# Patient Record
Sex: FEMALE
[De-identification: names, ages, dates, MRNs, and addresses within clinical notes are randomized per-mention and may not be internally consistent; named-entity substitution may affect disease eponyms.]

---

## 2020-04-06 NOTE — PCM.LDHP
L&D History of Present Illness





- General


Date of Service: 20


Admit Problem/Dx: 


 Patient Status Order with Admit Dx/Problem





20 12:51


Patient Status [ADT] Routine 








 Admission Diagnosis/Problem











Admission Diagnosis/Problem    Pregnant - planned














20 18:43


33yo  EDC 2020 32 6/7wks, come today due to abd pain and cramping. O+, 

RI, GBS unkwn.


Source of Information: Patient


History Limitations: Reports: No Limitations





- History of Present Illness


Timing/Duration: Reports: minutes:


Location, Pregnancy: Reports: Abdomen


Quality: Reports: Ache, Burning, Throbbing


Severity: Moderate


Pain Score: 8


Improves with: Reports: None


Worsens with: Reports: None


Associated Symptoms: Denies: vaginal bleeding, vaginal clots, vaginal tissue, 

vaginal discharge, vaginal fluid





- Related Data


Allergies/Adverse Reactions: 


 Allergies











Allergy/AdvReac Type Severity Reaction Status Date / Time


 


aspirin Allergy  Tachycardia Verified 20 12:49











Home Medications: 


 Home Meds





. [No Known Home Meds]  17 [History]











Past Medical History





- Past Health History


Medical/Surgical History: Denies Medical/Surgical History


HEENT History: Reports: None


Cardiovascular History: Reports: None


Respiratory History: Reports: None


Gastrointestinal History: Reports: Chronic Diarrhea, GERD


Genitourinary History: Reports: None


OB/GYN History: Reports: None


Musculoskeletal History: Reports: None


Neurological History: Reports: None


Psychiatric History: Reports: None


Endocrine/Metabolic History: Reports: Obesity/BMI 30+


Hematologic History: Reports: None


Immunologic History: Reports: None


Oncologic (Cancer) History: Reports: None


Dermatologic History: Reports: None





- Infectious Disease History


Infectious Disease History: Reports: Chicken Pox, Hepatitis C, Measles





- Past Surgical History


Head Surgeries/Procedures: Reports: None


GI Surgical History: Reports: Cholecystectomy





Social & Family History





- Family History


Family Medical History: Noncontributory





- Caffeine Use


Caffeine Use: Reports: None





H&P Review of Systems





- Review of Systems:


Review Of Systems: See Below


General: Reports: No Symptoms


HEENT: Reports: No Symptoms


Pulmonary: Reports: No Symptoms


Cardiovascular: Reports: No Symptoms


Gastrointestinal: Reports: No Symptoms


Genitourinary: Reports: No Symptoms


Musculoskeletal: Reports: No Symptoms


Skin: Reports: No Symptoms


Psychiatric: Reports: No Symptoms


Neurological: Reports: No Symptoms


Hematologic/Lymphatic: Reports: No Symptoms


Immunologic: Reports: No Symptoms





L&D Exam





- Exam


Exam: See Below





- Vital Signs


Weight: 207 kg





- OB Specific


Contraction Intensity: Moderate


Fetal Movement: Active


Fetal Heart Tones: Present


Fetal Heart Tones per Min: 140


Fetal Heart Rate (FHR) Variability: Moderate (6-25 bmp)


Birth Presentation: Vertex





- Tatum Score


Tatum Score Cervix Position: Midposition


Tatum Score Consistency: Soft


Tatum Score Effacement: 51-70%


Tatum Score Dilation: 1-2 cm


Tatum Score Infant's Station: -2


Tatum Score Total: 7





- Exam


General: Alert, Oriented, Cooperative, Mild Distress


HEENT: Hearing Intact


Lungs: Clear to Auscultation, Normal Respiratory Effort.  No: Decreased Breath 

Sounds


Cardiovascular: Regular Rate, Regular Rhythm, Normal S1, Normal S2


GI/Abdominal Exam: Soft, Non-Tender, Pelvis Stable


Rectal Exam: Deferred


Genitourinary: Normal external exam, Normal bimanual exam, Cervical dilitation.

  No: Cervical fluid, Vaginal bleeding


Back Exam: Normal Inspection, Full Range of Motion


Extremities: Normal Inspection, Normal Range of Motion, Non-Tender, No Pedal 

Edema


Skin: Warm, Dry, Intact


Neurological: Cranial Nerves Intact, Strength Equal Bilateral, Normal Gait, 

Normal Speech, Normal Tone, Sensation Intact


Psychiatric: Alert, Normal Affect, Normal Mood





- Patient Data


Lab Results Last 24 hrs: 


 Laboratory Results - last 24 hr











  20 Range/Units





  12:40 17:29 


 


WBC   10.71  (4.0-11.0)  K/uL


 


RBC   4.30  (4.30-5.90)  M/uL


 


Hgb   12.1  (12.0-16.0)  g/dL


 


Hct   37.4  (36.0-46.0)  %


 


MCV   87.0  (80.0-98.0)  fL


 


MCH   28.1  (27.0-32.0)  pg


 


MCHC   32.4  (31.0-37.0)  g/dL


 


RDW Std Deviation   43.0  (28.0-62.0)  fl


 


RDW Coeff of Thompson   14  (11.0-15.0)  %


 


Plt Count   152  (150-400)  K/uL


 


MPV   12.10 H  (7.40-12.00)  fL


 


Nucleated RBC %   0.0  /100WBC


 


Nucleated RBCs #   0  K/uL


 


Urine Color  YELLOW   


 


Urine Appearance  HAZY   


 


Urine pH  6.5   (5.0-8.0)  


 


Ur Specific Gravity  1.010   (1.001-1.035)  


 


Urine Protein  NEGATIVE   (NEGATIVE)  mg/dL


 


Urine Glucose (UA)  NEGATIVE   (NEGATIVE)  mg/dL


 


Urine Ketones  40 H   (NEGATIVE)  mg/dL


 


Urine Occult Blood  TRACE-INTACT H   (NEGATIVE)  


 


Urine Nitrite  NEGATIVE   (NEGATIVE)  


 


Urine Bilirubin  NEGATIVE   (NEGATIVE)  


 


Urine Urobilinogen  0.2   (<2.0)  EU/dL


 


Ur Leukocyte Esterase  NEGATIVE   (NEGATIVE)  











Result Diagrams: 


 20 17:29








- Problem List


(1) Supervision of normal IUP (intrauterine pregnancy) in primigravida


SNOMED Code(s): 79549635, 556595607, 927171807, 208344258


   ICD Code: Z34.00 - ENCNTR FOR SUPRVSN OF NORMAL FIRST PREGNANCY, UNSP 

TRIMESTER   Status: Acute   Priority: High   Current Visit: Yes   


Qualifiers: 


   Trimester: third trimester   Qualified Code(s): Z34.03 - Encounter for 

supervision of normal first pregnancy, third trimester   





(2)  contractions


SNOMED Code(s): 818192992


   ICD Code: O47.9 - FALSE LABOR, UNSPECIFIED   Status: Acute   Priority: High 

  Current Visit: Yes   


Problem List Initiated/Reviewed/Updated: Yes


Orders Last 24hrs: 


 Active Orders 24 hr











 Category Date Time Status


 


 Patient Status [ADT] Routine ADT  20 12:51 Active


 


 Communication Order [RC] Per Unit Routine Care  20 16:57 Active


 


 Communication Order [RC] Per Unit Routine Care  20 16:57 Active


 


 Fetal Heart Tones [RC] ASDIRECTED Care  20 16:57 Active


 


 Fetal Non Stress Test [RC] PER UNIT ROUTINE Care  20 12:51 Active


 


 Height and Weight [RC] DAILY Care  20 16:57 Active


 


 Intake and Output [RC] QSHIFT Care  20 16:57 Active


 


 Notify Provider [RC] PRN Care  20 16:57 Active


 


 Notify Provider [RC] PRN Care  20 16:57 Active


 


 Up ad Christel [RC] ASDIRECTED Care  20 12:51 Active


 


 Vaginal Exam [RC] Click to Edit Care  20 12:51 Active


 


 Vital Signs [RC] PER UNIT ROUTINE Care  20 12:51 Active


 


 Vital Signs [RC] PER UNIT ROUTINE Care  20 16:57 Active


 


 Betamet Acet/Betamet Na Phos [Celestone Soluspan 6 MG/ Med  20 17:00 

Active





 ML]   





 12 mg IM Q24H   


 


 Calcium Gluconate Med  20 16:57 Active





 1 gm IVPUSH ASDIRECTED PRN   


 


 Lactated Ringers [Ringers, Lactated] 1,000 ml Med  20 13:45 Active





 IV ASDIRECTED   


 


 Magnesium Sulfate/Water [Magnesium Sulfate in Water Med  20 17:00 Active





 Premix]   





 20 gm in 500 ml IV ASDIRECTED   


 


 Sodium Chloride 0.9% [Normal Saline] Med  20 13:45 Active





 10 ml IV ASDIRECTED PRN   


 


 Sodium Chloride 0.9% [Saline Flush] Med  20 13:45 Active





 10 ml FLUSH ASDIRECTED PRN   


 


 Sodium Chloride 0.9% [Saline Flush] Med  20 13:45 Active





 2.5 ml FLUSH ASDIRECTED PRN   


 


 Auscultate Breath Sounds [OM.PC] Per Unit Routine Oth  20 16:57 Ordered


 


 Deep Tendon Reflexes [WOMSER] [address]  20 17:00 Ordered


 


 Deep Tendon Reflexes [WOMSER] [address]  20 18:00 Ordered


 


 Deep Tendon Reflexes [WOMSER] [address]  20 19:00 Ordered


 


 Deep Tendon Reflexes [WOMSER] [address]  20 20:00 Ordered


 


 Deep Tendon Reflexes [WOMSER] [address]  20 21:00 Ordered


 


 Deep Tendon Reflexes [WOMSER] [address]  20 22:00 Ordered


 


 Deep Tendon Reflexes [WOMSER] [address]  20 23:00 Ordered


 


 Deep Tendon Reflexes [WOMSER] [address]  20 00:00 Ordered


 


 Deep Tendon Reflexes [WOMSER] [address]  20 01:00 Ordered


 


 Deep Tendon Reflexes [WOMSER] [address]  20 02:00 Ordered


 


 Deep Tendon Reflexes [WOMSER] [address]  20 03:00 Ordered


 


 Deep Tendon Reflexes [WOMSER] Q1H Oth  20 04:00 Ordered


 


 Deep Tendon Reflexes [WOMSER] Q1H Ot  20 05:00 Ordered


 


 Deep Tendon Reflexes [WOMSER] Q1 Ot  20 06:00 Ordered


 


 Deep Tendon Reflexes [WOMSER] Q1 Ot  20 07:00 Ordered


 


 Deep Tendon Reflexes [WOMSER] Q1 Ot  20 08:00 Ordered


 


 Deep Tendon Reflexes [WOMSER] Q1 Ot  20 09:00 Ordered


 


 Deep Tendon Reflexes [WOMSER] Q1 Ot  20 10:00 Ordered


 


 Deep Tendon Reflexes [WOMSER] Q1 Ot  20 11:00 Ordered


 


 Deep Tendon Reflexes [WOMSER] Q1 Ot  20 12:00 Ordered


 


 Deep Tendon Reflexes [WOMSER] Q1 Ot  20 13:00 Ordered


 


 Deep Tendon Reflexes [WOMSER] [facility]  20 14:00 Ordered


 


 Deep Tendon Reflexes [WOMSER] [facility]  20 15:00 Ordered


 


 Deep Tendon Reflexes [WOMSER] Q1 Ot  20 16:00 Ordered


 


 Deep Tendon Reflexes [WOMSER] Q1 Ot  20 17:00 Ordered


 


 Electronic Fetal Heart Tones Ext w TOCO [WOMSER] Per Oth  20 16:57 

Ordered





 Unit Routine   


 


 Peripheral IV Insertion Adult [OM.PC] Routine Oth  20 13:45 Ordered


 


 Resuscitation Status Routine Resus Stat  20 12:51 Ordered








 Medication Orders





Betamethasone Acet/Betameth SodPhos (Celestone Soluspan 6 Mg/Ml)  12 mg IM Q24H 

MIRELLA


   Stop: 20 17:01


   Last Admin: 20 17:36  Dose: 12 mg


Calcium Gluconate (Calcium Gluconate)  1 gm IVPUSH ASDIRECTED PRN


   PRN Reason: respiratory distress


Lactated Ringer's (Ringers, Lactated)  1,000 mls @ 999 mls/hr IV ASDIRECTED MIRELLA


   Last Admin: 20 14:10  Dose: 999 mls/hr


Magnesium Sulfate (Magnesium Sulfate In Water Premix)  20 gm in 500 mls @ 50 mls

/hr IV ASDIRECTED MIRELLA


   Last Admin: 20 17:56  Dose: 2 gm/hr, 50 mls/hr


Sodium Chloride (Saline Flush)  10 ml FLUSH ASDIRECTED PRN


   PRN Reason: Keep Vein Open


Sodium Chloride (Saline Flush)  2.5 ml FLUSH ASDIRECTED PRN


   PRN Reason: Keep Vein Open


Sodium Chloride (Normal Saline)  10 ml IV ASDIRECTED PRN


   PRN Reason: IV Use








Assessment/Plan Comment:: 





 Labor


A: 33yo  EDC 2020 32 6/7wks, come today due to abd pain and cramping. O

+, RI, GBS unkwn. SVE 2/70/-2 soft mid (tatum 7). States pain up to 8 until 

medication then down to 4. 


P: Admit for ops 24h. Gave IV fluid bolus, Terb 0.25sq, and IV Nubain for pain. 

This helped the pain and slowed the contractions some. SVE noted cervical 

change. Disc POC with Dr Fowler and started MgSo4 at 1756, betamethasone 12mg 

given at 1736. Will continue to monitor. If needed will transfer to [place].

## 2020-04-06 NOTE — US
Renal ultrasound: Multiple real-time images of the kidneys were 

obtained.

 

Maternal kidneys show no hydronephrosis or mass.  Right kidney 

measures 13.1 cm and left kidney measures 13.6 cm.  Bilateral ureteral

 jets are seen within the bladder.

 

Impression:

1.  No abnormality is seen on renal ultrasound study.

 

Diagnostic code #1

 

This report was dictated in MDT

## 2020-04-07 NOTE — PCM.PN
- General Info


Date of Service: 20


Admission Dx/Problem (Free Text): 


 Patient Status Order with Admit Dx/Problem





20 12:51


Patient Status [ADT] Routine 








 Admission Diagnosis/Problem











Admission Diagnosis/Problem    Pregnant - planned














20 18:43


33yo  EDC 2020 32 6/7wks, come today due to abd pain and cramping. O+, 

RI, GBS unkwn.


Functional Status: Reports: Pain Controlled





- Review of Systems


General: Reports: No Symptoms


HEENT: Reports: No Symptoms


Pulmonary: Reports: No Symptoms


Cardiovascular: Reports: No Symptoms


Gastrointestinal: Reports: No Symptoms


Genitourinary: Reports: No Symptoms


Musculoskeletal: Reports: No Symptoms


Skin: Reports: No Symptoms


Neurological: Reports: No Symptoms


Psychiatric: Reports: No Symptoms





- Patient Data


Weight - Most Recent: 93.894 kg


Lab Results Last 24 Hours: 


 Laboratory Results - last 24 hr











  20 Range/Units





  12:40 17:29 22:19 


 


WBC   10.71   (4.0-11.0)  K/uL


 


RBC   4.30   (4.30-5.90)  M/uL


 


Hgb   12.1   (12.0-16.0)  g/dL


 


Hct   37.4   (36.0-46.0)  %


 


MCV   87.0   (80.0-98.0)  fL


 


MCH   28.1   (27.0-32.0)  pg


 


MCHC   32.4   (31.0-37.0)  g/dL


 


RDW Std Deviation   43.0   (28.0-62.0)  fl


 


RDW Coeff of Thompson   14   (11.0-15.0)  %


 


Plt Count   152   (150-400)  K/uL


 


MPV   12.10 H   (7.40-12.00)  fL


 


Nucleated RBC %   0.0   /100WBC


 


Nucleated RBCs #   0   K/uL


 


Magnesium    5.0 H  (1.8-2.4)  mg/dL


 


Urine Color  YELLOW    


 


Urine Appearance  HAZY    


 


Urine pH  6.5    (5.0-8.0)  


 


Ur Specific Gravity  1.010    (1.001-1.035)  


 


Urine Protein  NEGATIVE    (NEGATIVE)  mg/dL


 


Urine Glucose (UA)  NEGATIVE    (NEGATIVE)  mg/dL


 


Urine Ketones  40 H    (NEGATIVE)  mg/dL


 


Urine Occult Blood  TRACE-INTACT H    (NEGATIVE)  


 


Urine Nitrite  NEGATIVE    (NEGATIVE)  


 


Urine Bilirubin  NEGATIVE    (NEGATIVE)  


 


Urine Urobilinogen  0.2    (<2.0)  EU/dL


 


Ur Leukocyte Esterase  NEGATIVE    (NEGATIVE)  














  20 Range/Units





  03:40 


 


WBC   (4.0-11.0)  K/uL


 


RBC   (4.30-5.90)  M/uL


 


Hgb   (12.0-16.0)  g/dL


 


Hct   (36.0-46.0)  %


 


MCV   (80.0-98.0)  fL


 


MCH   (27.0-32.0)  pg


 


MCHC   (31.0-37.0)  g/dL


 


RDW Std Deviation   (28.0-62.0)  fl


 


RDW Coeff of Thompson   (11.0-15.0)  %


 


Plt Count   (150-400)  K/uL


 


MPV   (7.40-12.00)  fL


 


Nucleated RBC %   /100WBC


 


Nucleated RBCs #   K/uL


 


Magnesium  5.8 H  (1.8-2.4)  mg/dL


 


Urine Color   


 


Urine Appearance   


 


Urine pH   (5.0-8.0)  


 


Ur Specific Gravity   (1.001-1.035)  


 


Urine Protein   (NEGATIVE)  mg/dL


 


Urine Glucose (UA)   (NEGATIVE)  mg/dL


 


Urine Ketones   (NEGATIVE)  mg/dL


 


Urine Occult Blood   (NEGATIVE)  


 


Urine Nitrite   (NEGATIVE)  


 


Urine Bilirubin   (NEGATIVE)  


 


Urine Urobilinogen   (<2.0)  EU/dL


 


Ur Leukocyte Esterase   (NEGATIVE)  











Med Orders - Current: 


 Current Medications





Betamethasone Acet/Betameth SodPhos (Celestone Soluspan 6 Mg/Ml)  12 mg IM Q24H 

[facility]


   Stop: 20 17:01


   Last Admin: 20 17:36 Dose:  12 mg


Calcium Gluconate (Calcium Gluconate)  1 gm IVPUSH ASDIRECTED PRN


   PRN Reason: respiratory distress


Lactated Ringer's (Ringers, Lactated)  1,000 mls @ 999 mls/hr IV ASDIRECTED MIRELLA


   Last Admin: 20 14:10 Dose:  999 mls/hr


Magnesium Sulfate (Magnesium Sulfate In Water Premix)  20 gm in 500 mls @ 50 mls

/hr IV ASDIRECTED MIRELLA


   Last Admin: 20 03:29 Dose:  2 gm/hr, 50 mls/hr


Sodium Chloride (Saline Flush)  10 ml FLUSH ASDIRECTED PRN


   PRN Reason: Keep Vein Open


Sodium Chloride (Saline Flush)  2.5 ml FLUSH ASDIRECTED PRN


   PRN Reason: Keep Vein Open


Sodium Chloride (Normal Saline)  10 ml IV ASDIRECTED PRN


   PRN Reason: IV Use





Discontinued Medications





Butorphanol Tartrate (Stadol)  1 mg IVPUSH ONETIME ONE


   Stop: 20 23:01


   Last Admin: 20 23:52 Dose:  1 mg


Magnesium Sulfate 2 gm/ Premix  50 mls @ 50 mls/hr IV ONETIME ONE


   Stop: 20 18:00


   Last Admin: 20 17:25 Dose:  50 mls/hr


Nalbuphine HCl (Nubain)  10 mg IVPUSH ONETIME ONE


   Stop: 20 13:44


   Last Admin: 20 14:18 Dose:  10 mg


Terbutaline Sulfate (Brethine)  0.25 mg SUBCUT ONETIME ONE


   Stop: 20 15:17


   Last Admin: 20 15:27 Dose:  0.25 mg


Terbutaline Sulfate (Brethine) Confirm Administered Dose 1 mg .ROUTE .STK-MED 

ONE


   Stop: 20 15:20


Terbutaline Sulfate (Brethine)  0.25 mg SUBCUT ONETIME ONE


   Stop: 20 22:55


   Last Admin: 20 23:11 Dose:  0.25 mg











- Exam


General: Alert, Oriented, Cooperative, No Acute Distress


Lungs: Clear to Auscultation, Normal Respiratory Effort.  No: Decreased Breath 

Sounds


Cardiovascular: Regular Rate, Regular Rhythm, No Murmurs


GI/Abdominal Exam: Soft, Non-Tender


 (Female) Exam: Deferred.  No: Cervical Fluid, Vaginal Bleeding


Back Exam: Normal Inspection, Full Range of Motion.  No: CVA Tenderness (L), 

CVA Tenderness (R), Decreased Range of Motion


Extremities: Normal Inspection, Normal Range of Motion, Non-Tender, No Pedal 

Edema, Other (DTR +1).  No: Pedal Edema, Leg Pain


Skin: Warm, Dry, Intact


Neurological: No New Focal Deficit, Normal Speech, Normal Tone, Strength Equal 

Bilateral


Psy/Mental Status: Alert, Normal Affect, Normal Mood





Sepsis Event Note





- Evaluation


Sepsis Screening Result: No Definite Risk





- Problem List & Annotations


(1) Supervision of normal IUP (intrauterine pregnancy) in primigravida


SNOMED Code(s): 54490700, 452424566, 377715561, 171202014


   Code(s): Z34.00 - ENCNTR FOR SUPRVSN OF NORMAL FIRST PREGNANCY, UNSP 

TRIMESTER   Status: Acute   Priority: High   Current Visit: Yes   


Qualifiers: 


   Trimester: third trimester   Qualified Code(s): Z34.03 - Encounter for 

supervision of normal first pregnancy, third trimester   





(2)  contractions


SNOMED Code(s): 412434742


   Code(s): O47.9 - FALSE LABOR, UNSPECIFIED   Status: Acute   Priority: High   

Current Visit: Yes   





- Problem List Review


Problem List Initiated/Reviewed/Updated: Yes





- My Orders


Last 24 Hours: 


My Active Orders





20 16:57


Communication Order [RC] Per Unit Routine 


Communication Order [RC] Per Unit Routine 


Fetal Heart Tones [RC] ASDIRECTED 


Height and Weight [RC] DAILY 


Intake and Output [RC] QSHIFT 


Notify Provider [RC] PRN 


Notify Provider [RC] PRN 


Vital Signs [RC] PER UNIT ROUTINE 


Calcium Gluconate   1 gm IVPUSH ASDIRECTED PRN 


Auscultate Breath Sounds [OM.PC] Per Unit Routine 


Electronic Fetal Heart Tones Ext w TOCO [WOMSER] Per Unit Routine 





20 17:00


Betamet Acet/Betamet Na Phos [Celestone Soluspan 6 MG/ML]   12 mg IM Q24H 


Magnesium Sulfate/Water [Magnesium Sulfate in Water Premix] 20 gm in 500 ml IV 

ASDIRECTED 


Deep Tendon Reflexes [WOMSER] Q1H 





20 18:00


Deep Tendon Reflexes [WOMSER] Q1H 





20 19:00


Deep Tendon Reflexes [WOMSER] Q1H 





20 20:00


Deep Tendon Reflexes [WOMSER] Q1H 





20 20:50


GBS [CULTURE GROUP B STREP] [RM] Routine 





20 21:00


Deep Tendon Reflexes [WOMSER] Q1H 





20 22:00


Deep Tendon Reflexes [WOMSER] Q1H 





20 23:00


Deep Tendon Reflexes [WOMSER] Q1H 





20 00:00


Deep Tendon Reflexes [WOMSER] Q1H 





20 01:00


Deep Tendon Reflexes [WOMSER] [facility] 





20 02:00


Deep Tendon Reflexes [WOMSER] [facility] 





20 03:00


Deep Tendon Reflexes [WOMSER] [facility] 





20 04:00


Deep Tendon Reflexes [WOMSER] [facility] 





20 05:00


Deep Tendon Reflexes [WOMSER] [facility] 





20 06:00


Deep Tendon Reflexes [WOMSER] [facility] 





20 07:00


Deep Tendon Reflexes [WOMSER] [facility] 





20 08:00


Deep Tendon Reflexes [WOMSER] [facility] 





20 09:00


Deep Tendon Reflexes [WOMSER] [facility] 





20 10:00


Deep Tendon Reflexes [WOMSER] [facility] 





20 11:00


Deep Tendon Reflexes [WOMSER] [facility] 





20 12:00


Deep Tendon Reflexes [WOMSER] [facility] 





20 13:00


Deep Tendon Reflexes [WOMSER] [facility] 





20 14:00


Deep Tendon Reflexes [WOMSER] [facility] 





20 15:00


Deep Tendon Reflexes [WOMSER] [facility] 





20 16:00


Deep Tendon Reflexes [WOMSER] [facility] 





20 17:00


Deep Tendon Reflexes [WOMSER] [facility] 





20 Breakfast


Regular Diet [DIET] 














- Plan


Plan:: 





 Labor


A: 33yo  EDC 2020 32 6/7wks, come today due to abd pain and cramping. O

+, RI, GBS unkwn. SVE 2/70/-2 soft mid (tatum 7). States pain up to 8 until 

medication then down to 4. 


P: Admit for ops 24h. Gave IV fluid bolus, Terb 0.25sq, and IV Nubain for pain. 

This helped the pain and slowed the contractions some. SVE noted cervical 

change. Disc POC with Dr Fowler and started MgSo4 at 1756, betamethasone 12mg 

given at 1736. Will continue to monitor. If needed will transfer to [place].

## 2020-04-10 NOTE — PCM.DCSUM1
**Discharge Summary





- Hospital Course


Free Text/Narrative:: 





Late entry discharge. Pt released home with labor precautions.


Diagnosis: Stroke: No


Modified [place] Scale: No Symptoms at All


Modified Sami Scale Score: 0





- Discharge Data


Discharge Date: 20


Discharge Disposition: Home, Self-Care 01


Condition: Stable





- Referral to Home Health


Primary Care Physician: 


Alanna Tejada CNM








- Discharge Diagnosis/Problem(s)


(1) Supervision of normal IUP (intrauterine pregnancy) in primigravida


SNOMED Code(s): 03906088, 570108178, 805183706, 585358699


   ICD Code: Z34.00 - ENCNTR FOR SUPRVSN OF NORMAL FIRST PREGNANCY, UNSP 

TRIMESTER   Status: Acute   Priority: High   


Qualifiers: 


   Trimester: third trimester   Qualified Code(s): Z34.03 - Encounter for 

supervision of normal first pregnancy, third trimester   





(2)  contractions


SNOMED Code(s): 352909911


   ICD Code: O47.9 - FALSE LABOR, UNSPECIFIED   Status: Acute   Priority: High 

  





- Patient Instructions


Diet: Usual Diet as Tolerated


Activity: As Tolerated, Rest and Relax Today


Driving: May Drive Today


Showering/Bathing: May Shower


Notify Provider of: Fever, Increased Pain, Swelling and Redness, Drainage, 

Nausea and/or Vomiting





- Discharge Plan


*PRESCRIPTION DRUG MONITORING PROGRAM REVIEWED*: Not Applicable


*COPY OF PRESCRIPTION DRUG MONITORING REPORT IN PATIENT PAULINA: Not Applicable


Home Medications: 


 Home Meds





NIFEdipine [Nifedical XL] 30 mg PO DAILY #30 tab.er 20 [Rx]


Omeprazole 20 mg PO DAILY 20 [History]


[id number]/Iron Fumarate/FA/DSS [Prenatal 19 Tablet] 1 tab PO DAILY 20 [

History]


Pyridoxine HCl (Vitamin B6) [B-6] 1 tab PO DAILY 20 [History]








Oxygen Therapy Mode: Room Air


Patient Handouts:   Labor and Birth Information, Easy-to-Read


Referrals: 


[facility] [Outside]


Zeina Diego CNM, NP [Mid-Wife] - 04/10/20 11:15 am





- Discharge Summary/Plan Comment


DC Time >30 min.: No





- General Info


Date of Service: 20


Admission Dx/Problem (Free Text: 


 Patient Status Order with Admit Dx/Problem





20 12:51


Patient Status [ADT] Routine 








 Admission Diagnosis/Problem











Admission Diagnosis/Problem    Pregnant - planned














20 18:43


35yo  EDC 2020 32 6/7wks, come today due to abd pain and cramping. O+, 

RI, GBS unkwn.


Functional Status: Reports: Pain Controlled, Tolerating Diet, Ambulating, 

Urinating





- Review of Systems


General: Reports: No Symptoms


HEENT: Reports: No Symptoms


Pulmonary: Reports: No Symptoms


Cardiovascular: Reports: No Symptoms


Gastrointestinal: Reports: No Symptoms


Genitourinary: Reports: No Symptoms


Musculoskeletal: Reports: No Symptoms


Skin: Reports: No Symptoms


Neurological: Reports: No Symptoms


Psychiatric: Reports: No Symptoms





- Patient Data


Weight - Most Recent: 93.894 kg


MARLENA Results - Last 24 hrs: 


 Microbiology











 20 20:50 Group B Streptococcus Culture - Final





 Vaginal/Rectal    NEGATIVE STREP GROUP B











Med Orders - Current: 


 Current Medications








Discontinued Medications





Betamethasone Acet/Betameth SodPhos (Celestone Soluspan 6 Mg/Ml)  12 mg IM Q24H 

[facility]


   Stop: 20 17:01


   Last Admin: 20 17:30 Dose:  12 mg


Butorphanol Tartrate (Stadol)  1 mg IVPUSH ONETIME ONE


   Stop: 20 23:01


   Last Admin: 20 23:52 Dose:  1 mg


Calcium Gluconate (Calcium Gluconate)  1 gm IVPUSH ASDIRECTED PRN


   PRN Reason: respiratory distress


Lactated Ringer's (Ringers, Lactated)  1,000 mls @ 999 mls/hr IV ASDIRECTED [facility]


   Last Admin: 20 14:10 Dose:  999 mls/hr


Magnesium Sulfate (Magnesium Sulfate In Water Premix)  20 gm in 500 mls @ 50 mls

/hr IV ASDIRECTED [facility]


   Last Admin: 20 03:29 Dose:  2 gm/hr, 50 mls/hr


Magnesium Sulfate 2 gm/ Premix  50 mls @ 50 mls/hr IV ONETIME ONE


   Stop: 20 18:00


   Last Admin: 20 17:25 Dose:  50 mls/hr


Nalbuphine HCl (Nubain)  10 mg IVPUSH ONETIME ONE


   Stop: 20 13:44


   Last Admin: 20 14:18 Dose:  10 mg


Sodium Chloride (Saline Flush)  10 ml FLUSH ASDIRECTED PRN


   PRN Reason: Keep Vein Open


Sodium Chloride (Saline Flush)  2.5 ml FLUSH ASDIRECTED PRN


   PRN Reason: Keep Vein Open


Sodium Chloride (Normal Saline)  10 ml IV ASDIRECTED PRN


   PRN Reason: IV Use


Terbutaline Sulfate (Brethine)  0.25 mg SUBCUT ONETIME ONE


   Stop: 20 15:17


   Last Admin: 20 15:27 Dose:  0.25 mg


Terbutaline Sulfate (Brethine) Confirm Administered Dose 1 mg .ROUTE .STK-MED 

ONE


   Stop: 20 15:20


Terbutaline Sulfate (Brethine)  0.25 mg SUBCUT ONETIME ONE


   Stop: 20 22:55


   Last Admin: 20 23:11 Dose:  0.25 mg











- Exam


General: Reports: Alert, Oriented, Cooperative, No Acute Distress


Lungs: Reports: Clear to Auscultation, Normal Respiratory Effort


Cardiovascular: Reports: Regular Rate, Regular Rhythm


GI/Abdominal Exam: Soft, Non-Tender


 (Female) Exam: Normal External Exam, Normal Bimanual Exam, Cervical 

Dilatation (No change in cervical exam)


Rectal (Female) Exam: Deferred


Back Exam: Reports: Normal Inspection, Full Range of Motion


Extremities: Normal Inspection, Normal Range of Motion, Non-Tender, No Pedal 

Edema


Skin: Reports: Warm, Dry, Intact


Neurological: Reports: No New Focal Deficit, Normal Gait, Normal Speech, Normal 

Tone, Strength Equal Bilateral


Psy/Mental Status: Reports: Alert, Normal Affect, Normal Mood

## 2020-05-04 ENCOUNTER — HOSPITAL ENCOUNTER (INPATIENT)
Dept: HOSPITAL 56 - MW.OBCHECK | Age: 35
LOS: 1 days | Discharge: HOME | End: 2020-05-05
Attending: OBSTETRICS & GYNECOLOGY | Admitting: OBSTETRICS & GYNECOLOGY
Payer: SELF-PAY

## 2020-05-04 DIAGNOSIS — E66.9: ICD-10-CM

## 2020-05-04 DIAGNOSIS — Z88.6: ICD-10-CM

## 2020-05-04 DIAGNOSIS — Z3A.36: ICD-10-CM

## 2020-05-04 DIAGNOSIS — Z91.040: ICD-10-CM

## 2020-05-04 PROCEDURE — 3E0R3BZ INTRODUCTION OF ANESTHETIC AGENT INTO SPINAL CANAL, PERCUTANEOUS APPROACH: ICD-10-PCS

## 2020-05-04 PROCEDURE — 00HU33Z INSERTION OF INFUSION DEVICE INTO SPINAL CANAL, PERCUTANEOUS APPROACH: ICD-10-PCS

## 2020-05-04 PROCEDURE — 0KQM0ZZ REPAIR PERINEUM MUSCLE, OPEN APPROACH: ICD-10-PCS | Performed by: OBSTETRICS & GYNECOLOGY

## 2020-05-04 NOTE — PCM.SN.2
- Free Text/Narrative


Note: 





Requested for YISSEL on , active labor, pain 8/10.





Bolus in progress.





Discussed, ? answered, permit signed, wishes to proceed.





Procedure without issues.  Space ID'd via [email or web address] with saline/air on .  

Reconfirmed with saline.





Cath to 8cm without problems.  Occlusive Drsg.  Test NEGATIVE.





Bolus given slowly, significant decrease in discomfort.  VSS.





Infusion started 8ml/hr with 4ml q15 bolus.





Tolerated well  VSS  Pain 1/10 presently.

## 2020-05-04 NOTE — PCM.PRNOTE
- Free Text/Narrative


Note: 





Requested for IV access after previous failed attempts.  Discussed, ? answered.

  #20g L hand on 1st attempt.  Skin localized with 0.6ml 1 Lidocaine.


Tolerated well.

## 2020-05-04 NOTE — PCM.LDHP
L&D History of Present Illness





- General


Date of Service: 20


Admit Problem/Dx: 


 Patient Status Order with Admit Dx/Problem





20 13:06


Patient Status [ADT] Routine 





20 13:39


Patient Status [ADT] Routine 








 Admission Diagnosis/Problem











Admission Diagnosis/Problem    Planned pregnancy














20 14:07


Shira is a 36 yo  at 36.6 weeks gestation (HERIBERTO 2020) that presents to L

&D today from office for C/O strong, painful contractions every 4-5 min.  SVE 

in office 3/90/-1, mid-position.  Amnisure positive today.  O pos, RI, GBS neg (

2020).  Pertient history includes: Obesity (BMI 32.9),  Hx of PTL this 

pregnancy, would to right breast/chest wall drained 1 week ago.  


Source of Information: Patient


History Limitations: Reports: No Limitations





- History of Present Illness


Timing/Duration: Reports: gradual onset


Location, Pregnancy: Reports: Abdomen


Quality: Reports: Sharp


Severity: Severe


Improves with: Reports: Movement


Worsens with: Reports: None





- Related Data


Allergies/Adverse Reactions: 


 Allergies











Allergy/AdvReac Type Severity Reaction Status Date / Time


 


aspirin Allergy  Tachycardia Verified 20 12:49


 


latex Allergy  Rash Verified 20 12:01











Home Medications: 


 Home Meds





NIFEdipine [Nifedical XL] 30 mg PO DAILY #30 tab.er 20 [Rx]


Omeprazole 20 mg PO DAILY 20 [History]


Prenat 115/Iron Fum/Folic/Dss [Prenatal 19 Tablet] 1 tab PO DAILY 20 [

History]


Pyridoxine HCl (Vitamin B6) [B-6] 1 tab PO DAILY 20 [History]


Calcium Carbonate [Calcium] 1 tab PO DAILY 20 [History]


Folic Acid 1 tab PO DAILY 20 [History]











Past Medical History





- Past Health History


Medical/Surgical History: Denies Medical/Surgical History


HEENT History: Reports: None


Cardiovascular History: Reports: Other (See Below)


Other Cardiovascular History: Hypercholesterolemia


Respiratory History: Reports: None


Gastrointestinal History: Reports: GERD


Genitourinary History: Reports: None


OB/GYN History: Reports: Pregnancy


: 1


Para: 0


LMP (Approximate): Pregnant


Other OB/BYN History: ASCUS pap 2019, colposcopy 2019.


Musculoskeletal History: Reports: None


Neurological History: Reports: None


Psychiatric History: Reports: None


Endocrine/Metabolic History: Reports: Obesity/BMI 30+


Hematologic History: Reports: None


Immunologic History: Reports: None


Oncologic (Cancer) History: Reports: None


Dermatologic History: Reports: None





- Infectious Disease History


Infectious Disease History: Reports: Chicken Pox, Hepatitis A





- Past Surgical History


GI Surgical History: Reports: Cholecystectomy





Social & Family History





- Family History


Family Medical History: Noncontributory


HEENT: Reports: Impaired Vision


Cardiac: Reports: High Cholesterol, Hypertension, MI


Respiratory: Reports: Asthma


GI: Reports: None


: Reports: None


OBGYN: Reports: Pregnancy


Musculoskeletal: Reports: RA


Neurological: Reports: CVA


Psychiatric: Reports: None


Endocrine/Metabolic: Reports: Hypothyroidism


Hematologic: Reports: None


Immunologic: Reports: None


Dermatologic: Reports: None


Oncologic: Reports: Breast





- Tobacco Use


Smoking Status *Q: Never Smoker


Tobacco Use Within Last Twelve Months: No





- Caffeine Use


Caffeine Use: Reports: None





- Alcohol Use


Alcohol Use History: No





H&P Review of Systems





- Review of Systems:


Review Of Systems: Comprehensive ROS is negative, except as noted in HPI.


General: Reports: No Symptoms


HEENT: Reports: No Symptoms


Pulmonary: Reports: No Symptoms


Cardiovascular: Reports: No Symptoms


Gastrointestinal: Reports: No Symptoms


Genitourinary: Reports: No Symptoms


Musculoskeletal: Reports: No Symptoms


Skin: Reports: No Symptoms


Psychiatric: Reports: No Symptoms


Neurological: Reports: No Symptoms


Hematologic/Lymphatic: Reports: No Symptoms


Immunologic: Reports: No Symptoms





L&D Exam





- Exam


Exam: See Below





- Vital Signs


Weight: 210 lb





- OB Specific


Fundal Height In cm: 38


Contraction Duration (sec): 60-90


Contraction Frequency (min): 4-5


Contraction Intensity: Strong


Fetal Movement: Active


Fetal Heart Tones: Present


Fetal Heart Tones per Min: 135


Fetal Heart Rate (FHR) Variability: Moderate (6-25 bmp)


Birth Presentation: Vertex





- Otero Score


Otero Score Cervix Position: Midposition


Otero Score Consistency: Soft


Otero Score Effacement: >80%


Otero Score Dilation: 3-4 cm


Otero Score Infant's Station: -1 ,0


Otero Score Total: 10





- Exam


General: Alert, Oriented


HEENT: Conjunctiva Clear, EACs Clear, EOMI, Hearing Intact, Mucosa Moist & [place]

, PERRLA


Neck: Supple, Trachea Midline


Lungs: Clear to Auscultation, Normal Respiratory Effort


Cardiovascular: Regular Rate, Regular Rhythm


GI/Abdominal Exam: Normal Bowel Sounds, Soft, Non-Tender, No Organomegaly, No 

Distention, Pelvis Stable, Other (Gravid uterus)


Rectal Exam: Deferred


Genitourinary: Normal external exam, Normal bimanual exam


Back Exam: Normal Inspection, Full Range of Motion


Extremities: Normal Inspection, Normal Range of Motion, Non-Tender, No Pedal 

Edema, Normal Capillary Refill


Skin: Warm, Dry, Intact


Neurological: Cranial Nerves Intact, Reflexes Equal Bilateral


Psychiatric: Alert, Normal Affect, Normal Mood





- Problem List


(1) Uterine contractions


SNOMED Code(s): 021959558


   ICD Code: AFX1001 -    Status: Acute   Priority: High   Current Visit: Yes   





(2) AMA (advanced maternal age) multigravida 35+


SNOMED Code(s): 057122015


   ICD Code: O09.529 - SUPERVISION OF ELDERLY MULTIGRAVIDA, UNSPECIFIED 

TRIMESTER   Status: Acute   Priority: High   Current Visit: Yes   





(3) 36 weeks gestation of pregnancy


SNOMED Code(s): 23751840


   ICD Code: Z3A.36 - 36 WEEKS GESTATION OF PREGNANCY   Status: Acute   Priority

: High   Current Visit: Yes   





(4) Supervision of normal IUP (intrauterine pregnancy) in primigravida


SNOMED Code(s): 57259450, 168146067, 443735930, 172525966


   ICD Code: Z34.00 - ENCNTR FOR SUPRVSN OF NORMAL FIRST PREGNANCY, UNSP 

TRIMESTER   Status: Acute   Priority: High   Current Visit: Yes   


Qualifiers: 


   Trimester: third trimester   Qualified Code(s): Z34.03 - Encounter for 

supervision of normal first pregnancy, third trimester   


Problem List Initiated/Reviewed/Updated: Yes


Orders Last 24hrs: 


 Active Orders 24 hr











 Category Date Time Status


 


 Patient Status [ADT] Routine ADT  20 13:06 Active


 


 Patient Status [ADT] Routine ADT  20 13:39 Active


 


 Fetal Heart Tones [RC] CONTINUOUS Care  20 13:39 Active


 


 Fetal Non Stress Test [RC] PER UNIT ROUTINE Care  20 13:06 Active


 


 Fetal Non Stress Test [RC] PER UNIT ROUTINE Care  20 13:39 Active


 


 May Shower [RC] ASDIRECTED Care  20 13:39 Active


 


 Notify Provider [RC] PRN Care  20 13:39 Active


 


 Up ad Christel [RC] ASDIRECTED Care  20 13:06 Active


 


 Up ad Christel [RC] ASDIRECTED Care  20 13:39 Active


 


 Vaginal Exam [RC] Click to Edit Care  20 13:06 Active


 


 Vaginal Exam [RC] PRN Care  20 13:39 Active


 


 Vital Signs [RC] PER UNIT ROUTINE Care  20 13:06 Active


 


 Vital Signs [RC] PER UNIT ROUTINE Care  20 13:39 Active


 


 CBC W/O DIFF,HEMOGRAM [HEME] Routine Lab  20 13:39 Ordered


 


 RPR (SYPHILIS SERO) W/ RFLX [REF] Routine Lab  20 13:39 Ordered


 


 TYPE AND SCREEN [BBK] Routine Lab  20 13:39 Ordered


 


 Butorphanol [Stadol] Med  20 13:39 Active





 1 mg IVPUSH Q1H PRN   


 


 Carboprost Tromethamine [Hemabate DS] Med  20 13:39 Active





 250 mcg IM ASDIRECTED PRN   


 


 Lactated Ringers [Ringers, Lactated] 1,000 ml Med  20 13:45 Active





 IV ASDIRECTED   


 


 Lidocaine 1% [Xylocaine 1%] Med  20 13:39 Active





 50 ml INJECT ONETIME PRN   


 


 Methylergonovine [Methergine] Med  20 13:39 Active





 0.2 mg IM ASDIRECTED PRN   


 


 Nalbuphine [Nubain] Med  20 13:39 Active





 10 mg IVPUSH Q1H PRN   


 


 Oxytocin/0.9 % Sodium Chloride [Oxytocin 30 Unit/500 ML Med  20 13:45 

Active





 -NS]   





 30 unit in 500 ml IV TITRATE   


 


 Sodium Chloride 0.9% [Normal Saline] Med  20 13:39 Active





 10 ml IV ASDIRECTED PRN   


 


 Sodium Chloride 0.9% [Saline Flush] Med  20 13:39 Active





 10 ml FLUSH ASDIRECTED PRN   


 


 Sodium Chloride 0.9% [Saline Flush] Med  20 13:39 Active





 2.5 ml FLUSH ASDIRECTED PRN   


 


 Tranexamic Acid [Cyklokapron] 1,000 mg Med  20 13:39 Active





 Sodium Chloride 0.9% [Normal Saline] 100 ml   





 IV ONETIME   


 


 Water For Irrigation,Sterile [Sterile Water for Med  20 13:39 Active





 Irrigation]   





 1,000 ml IRR ASDIRECTED PRN   


 


 miSOPROStoL [Cytotec] Med  20 13:39 Active





 200 mcg PO ONETIME PRN   


 


 Fetal Scalp Electrode [WOMSER] Per Unit Routine Oth  20 13:39 Ordered


 


 Peripheral IV Insertion Adult [OM.PC] Routine Oth  20 13:39 Ordered


 


 Resuscitation Status Routine Resus Stat  20 13:06 Ordered








 Medication Orders





Butorphanol Tartrate (Stadol)  1 mg IVPUSH Q1H PRN


   PRN Reason: Pain


Carboprost Tromethamine (Hemabate Ds)  250 mcg IM ASDIRECTED PRN


   PRN Reason: Post Partum Hemorrhage


Tranexamic Acid 1,000 mg/ (Sodium Chloride)  110 mls @ 660 mls/hr IV ONETIME PRN


   PRN Reason: Bleeding


Lactated Ringer's (Ringers, Lactated)  1,000 mls @ 150 mls/hr IV ASDIRECTED [facility]


Oxytocin/Sodium Chloride (Oxytocin 30 Unit/500 Ml-Ns)  30 unit in 500 mls @ 500 

mls/hr IV TITRATE [facility]


Lidocaine HCl (Xylocaine 1%)  50 ml INJECT ONETIME PRN


   PRN Reason: Laceration repair


Methylergonovine Maleate (Methergine)  0.2 mg IM ASDIRECTED PRN


   PRN Reason: Post Partum Hemorrhage


Misoprostol (Cytotec)  200 mcg PO ONETIME PRN


   PRN Reason: Post Partum Hemorrhage


Nalbuphine HCl (Nubain)  10 mg IVPUSH Q1H PRN


   PRN Reason: Pain (severe 7-10)


Sodium Chloride (Saline Flush)  10 ml FLUSH ASDIRECTED PRN


   PRN Reason: Keep Vein Open


Sodium Chloride (Saline Flush)  2.5 ml FLUSH ASDIRECTED PRN


   PRN Reason: Keep Vein Open


Sodium Chloride (Normal Saline)  10 ml IV ASDIRECTED PRN


   PRN Reason: IV Use


Sterile Water (Sterile Water For Irrigation)  1,000 ml IRR ASDIRECTED PRN


   PRN Reason: delivery








Assessment/Plan Comment:: 


Admit to labor and delivery today for SROM at unknown time, labor progression, 

 at 36.6 weeks gestation.

## 2020-05-04 NOTE — PCM.PREANE
Preanesthetic Assessment





- Procedure


Proposed Procedure: 





YISSEL





- Anesthesia/Transfusion/Family Hx


Anesthesia History: Prior Anesthesia Without Reaction


Family History of Anesthesia Reaction: No


Transfusion History: No Prior Transfusion(s)


Intubation History: Unknown





- Review of Systems


General: No Symptoms


Pulmonary: No Symptoms


Cardiovascular: No Symptoms


Gastrointestinal: No Symptoms


Neurological: No Symptoms


Other: Reports: Anxiety





- Physical Assessment


NPO Status Date: 05/04/20


NPO Status Time: 15:41 (Clear liquids)


Height: 1.68 m


Weight: 95.254 kg


ASA Class: 2


Mental Status: Alert & Oriented x3


Airway Class: Mallampati = 2


Dentition: Reports: Normal Dentition


Thyro-Mental Finger Breadths: 3


Mouth Opening Finger Breadths: 3


ROM/Head Extension: Full


Lungs: Clear to Auscultation


Cardiovascular: Regular Rate





- Lab


Values: 





 Laboratory Last Values











WBC  11.56 K/uL (4.0-11.0)  H  05/04/20  14:15    


 


RBC  4.52 M/uL (4.30-5.90)   05/04/20  14:15    


 


Hgb  12.9 g/dL (12.0-16.0)   05/04/20  14:15    


 


Hct  39.4 % (36.0-46.0)   05/04/20  14:15    


 


MCV  87.2 fL (80.0-98.0)   05/04/20  14:15    


 


MCH  28.5 pg (27.0-32.0)   05/04/20  14:15    


 


MCHC  32.7 g/dL (31.0-37.0)   05/04/20  14:15    


 


RDW Std Deviation  43.2 fl (28.0-62.0)   05/04/20  14:15    


 


RDW Coeff of Thompson  14 % (11.0-15.0)   05/04/20  14:15    


 


Plt Count  164 K/uL (150-400)   05/04/20  14:15    


 


MPV  12.80 fL (7.40-12.00)  H  05/04/20  14:15    


 


Nucleated RBC %  0.0 /100WBC  05/04/20  14:15    


 


Nucleated RBCs #  0 K/uL  05/04/20  14:15    


 


Blood Type  O POSITIVE   05/04/20  14:15    


 


Antibody Screen  NEGATIVE   05/04/20  14:15    














- Allergies


Allergies/Adverse Reactions: 


 Allergies











Allergy/AdvReac Type Severity Reaction Status Date / Time


 


aspirin Allergy  Tachycardia Verified 04/06/20 12:49


 


latex Allergy  Rash Verified 04/08/20 12:01














- Blood


Blood Available: No


Product(s) Available: None





- Anesthesia Plan


Pre-Op Medication Ordered: None





- Acknowledgements


Anesthesia Type Planned: Epidural


Pt an Appropriate Candidate for the Planned Anesthesia: Yes


Alternatives and Risks of Anesthesia Discussed w Pt/Guardian: Yes


Pt/Guardian Understands and Agrees with Anesthesia Plan: Yes


Additional Comments: 





Discussed.  ? answered.  Wishes to proceed. Permit signed. Bolus in progress.





PreAnesthesia Questionnaire





- Past Health History


Medical/Surgical History: Denies Medical/Surgical History


HEENT History: Reports: None


Cardiovascular History: Reports: Other (See Below)


Other Cardiovascular History: Hypercholesterolemia


Respiratory History: Reports: None


Gastrointestinal History: Reports: GERD


Genitourinary History: Reports: None


OB/GYN History: Reports: Pregnancy


Other OB/BYN History: ASCUS pap 5/2019, colposcopy 6/2019.


Musculoskeletal History: Reports: None


Neurological History: Reports: None


Psychiatric History: Reports: None


Endocrine/Metabolic History: Reports: Obesity/BMI 30+


Hematologic History: Reports: None


Immunologic History: Reports: None


Oncologic (Cancer) History: Reports: None


Dermatologic History: Reports: None





- Infectious Disease History


Infectious Disease History: Reports: Chicken Pox, Hepatitis A





- Past Surgical History


GI Surgical History: Reports: Cholecystectomy





- SUBSTANCE USE


Smoking Status *Q: Never Smoker


Tobacco Use Within Last Twelve Months: No





- HOME MEDS


Home Medications: 


 Home Meds





NIFEdipine [Nifedical XL] 30 mg PO DAILY #30 tab.er 04/08/20 [Rx]


Omeprazole 20 mg PO DAILY 04/08/20 [History]


Prenat 115/Iron Fum/Folic/Dss [Prenatal 19 Tablet] 1 tab PO DAILY 04/08/20 [

History]


Pyridoxine HCl (Vitamin B6) [B-6] 1 tab PO DAILY 04/08/20 [History]


Calcium Carbonate [Calcium] 1 tab PO DAILY 05/04/20 [History]


Folic Acid 1 tab PO DAILY 05/04/20 [History]











- CURRENT (IN HOUSE) MEDS


Current Meds: 





 Current Medications





Butorphanol Tartrate (Stadol)  1 mg IVPUSH Q1H PRN


   PRN Reason: Pain


Carboprost Tromethamine (Hemabate Ds)  250 mcg IM ASDIRECTED PRN


   PRN Reason: Post Partum Hemorrhage


Tranexamic Acid 1,000 mg/ (Sodium Chloride)  110 mls @ 660 mls/hr IV ONETIME PRN


   PRN Reason: Bleeding


Lactated Ringer's (Ringers, Lactated)  1,000 mls @ 150 mls/hr IV ASDIRECTED [facility]


   Last Admin: 05/04/20 15:40 Dose:  999 mls/hr


Oxytocin/Sodium Chloride (Oxytocin 30 Unit/500 Ml-Ns)  30 unit in 500 mls @ 500 

mls/hr IV TITRATE [facility]


Lidocaine HCl (Xylocaine 1%)  50 ml INJECT ONETIME PRN


   PRN Reason: Laceration repair


Methylergonovine Maleate (Methergine)  0.2 mg IM ASDIRECTED PRN


   PRN Reason: Post Partum Hemorrhage


Misoprostol (Cytotec)  200 mcg PO ONETIME PRN


   PRN Reason: Post Partum Hemorrhage


Nalbuphine HCl (Nubain)  10 mg IVPUSH Q1H PRN


   PRN Reason: Pain (severe 7-10)


Sodium Chloride (Saline Flush)  10 ml FLUSH ASDIRECTED PRN


   PRN Reason: Keep Vein Open


Sodium Chloride (Saline Flush)  2.5 ml FLUSH ASDIRECTED PRN


   PRN Reason: Keep Vein Open


Sodium Chloride (Normal Saline)  10 ml IV ASDIRECTED PRN


   PRN Reason: IV Use


Sterile Water (Sterile Water For Irrigation)  1,000 ml IRR ASDIRECTED PRN


   PRN Reason: delivery





Discontinued Medications





Fentanyl (Sublimaze) Confirm Administered Dose 100 mcg .ROUTE .STK-MED ONE


   Stop: 05/04/20 14:56


Lidocaine HCl (Xylocaine-Mpf 1%) Confirm Administered Dose 2 mls @ as directed 

.ROUTE .STK-MED ONE


   Stop: 05/04/20 14:17


Ropivacaine (Naropin 0.2%) Confirm Administered Dose 100 mls @ as directed 

.ROUTE .STK-MED ONE


   Stop: 05/04/20 14:56


Ropivacaine (Naropin 0.2%) Confirm Administered Dose 20 ml .ROUTE .STK-MED ONE


   Stop: 05/04/20 14:56

## 2020-05-04 NOTE — PCM.DEL
L & D Note





- General Info


Date of Service: 20


Mother's Due Date: 20





- Delivery Note


Labor: Spontaneous


Delivery Outcome: Livebirth


Infant Delivery Method: Spontaneous Vaginal Delivery-Single


Infant Delivery Mode: Spontaneous


Birth Presentation: Right Occiput Anterior (CHANCE)


Nuchal Cord: None


Anesthesia Type: Epidural


Amniotic Fluid Description: Clear


Episiotomy Type: None


Laceration: 2nd Degree (Repaired with 3.0 vicryl on CT)


Suture type: Vicryl


Suture size: 3-0


Placenta: Intact, Spontaneous (Intact, 3VC, Nguyen)


Cord: 3 Vessels


Estimated Blood Loss: 200


Resuscitation Needed: No


: Stimulated, Warmed, Navarre Used


APGAR Score 1 min: 8


APGAR Score 5 min: 9


Second Stage Interventions: Reports: Encouragement Given, Pushing Effectively, 

Pushing, Stirrups/Leg Supports


Delivery Comments (Free Text/Narrative):: 


Shira is a 36 yo  at 36.6 weeks gestation S/P uncomplicated  of viable, 

vigorous NBF with spontaneous cries.  NBF placed to maternal abdomen upon birth

, warmed, dried, and stimulated.  Umbilical cord remained intact until 

pulsation ceased, clamped x2 and cut by FOB.  Placenta was birthed intact, 

Nguyen, 3VC approximately 5-7 min after birth of NBF.  Apgars 8/9, weight 3190 

g (7 lb 1 oz).  2nd degree laceration noted to posterior vaginal wall and 

perineum, repaired with 3.0 vicryl on CT, hemostatic.  Uterus firm @ U, scant 

rubra lochia noted.  Mother and NBF resting skin-to-skin in bed comfortably.  











- General Info


Date of Service: 20


Admission Dx/Problem (Free Text): 


 Patient Status Order with Admit Dx/Problem





20 13:06


Patient Status [ADT] Routine 





20 13:39


Patient Status [ADT] Routine 








 Admission Diagnosis/Problem











Admission Diagnosis/Problem    Planned pregnancy














20 14:07


Shira is a 36 yo  at 36.6 weeks gestation (HERIBERTO 2020) that presents to L

&D today from office for C/O strong, painful contractions every 4-5 min.  SVE 

in office 3/90/-, mid-position.  Amnisure positive today.  O pos, RI, GBS neg (

2020).  Pertient history includes: Obesity (BMI 32.9),  Hx of PTL this 

pregnancy, would to right breast/chest wall drained 1 week ago.  


Functional Status: Reports: Pain Controlled





- Review of Systems


General: Reports: No Symptoms


HEENT: Reports: No Symptoms


Pulmonary: Reports: No Symptoms


Cardiovascular: Reports: No Symptoms


Gastrointestinal: Reports: No Symptoms


Genitourinary: Reports: No Symptoms


Musculoskeletal: Reports: No Symptoms


Skin: Reports: No Symptoms


Neurological: Reports: No Symptoms


Psychiatric: Reports: No Symptoms





- Patient Data


Weight - Most Recent: 210 lb


I&O - Last 24 Hours: 


 Intake & Output











 20





 06:59 14:59 22:59


 


Intake Total   


 


Balance   











Lab Results Last 24 Hours: 


 Laboratory Results - last 24 hr











  20 Range/Units





  14:15 14:15 


 


WBC  11.56 H   (4.0-11.0)  K/uL


 


RBC  4.52   (4.30-5.90)  M/uL


 


Hgb  12.9   (12.0-16.0)  g/dL


 


Hct  39.4   (36.0-46.0)  %


 


MCV  87.2   (80.0-98.0)  fL


 


MCH  28.5   (27.0-32.0)  pg


 


MCHC  32.7   (31.0-37.0)  g/dL


 


RDW Std Deviation  43.2   (28.0-62.0)  fl


 


RDW Coeff of Thompson  14   (11.0-15.0)  %


 


Plt Count  164   (150-400)  K/uL


 


MPV  12.80 H   (7.40-12.00)  fL


 


Nucleated RBC %  0.0   /100WBC


 


Nucleated RBCs #  0   K/uL


 


Blood Type   O POSITIVE  


 


Antibody Screen   NEGATIVE  











Med Orders - Current: 


 Current Medications





Butorphanol Tartrate (Stadol)  1 mg IVPUSH Q1H PRN


   PRN Reason: Pain


Carboprost Tromethamine (Hemabate Ds)  250 mcg IM ASDIRECTED PRN


   PRN Reason: Post Partum Hemorrhage


Tranexamic Acid 1,000 mg/ (Sodium Chloride)  110 mls @ 660 mls/hr IV ONETIME PRN


   PRN Reason: Bleeding


Lactated Ringer's (Ringers, Lactated)  1,000 mls @ 150 mls/hr IV ASDIRECTED MIRELLA


   Last Infusion: 20 16:10 Dose:  150 mls/hr


Oxytocin/Sodium Chloride (Oxytocin 30 Unit/500 Ml-Ns)  30 unit in 500 mls @ 500 

mls/hr IV TITRATE MIRELLA


Oxytocin/Sodium Chloride (Oxytocin 30 Unit/500 Ml-Ns)  30 unit in 500 mls @ 2 

mls/hr IV TITRATE MIRELLA; Protocol


   Last Admin: 20 19:44 Dose:  2 munits/min, 2 mls/hr


Lidocaine HCl (Xylocaine 1%)  50 ml INJECT ONETIME PRN


   PRN Reason: Laceration repair


Methylergonovine Maleate (Methergine)  0.2 mg IM ASDIRECTED PRN


   PRN Reason: Post Partum Hemorrhage


Misoprostol (Cytotec)  200 mcg PO ONETIME PRN


   PRN Reason: Post Partum Hemorrhage


Nalbuphine HCl (Nubain)  10 mg IVPUSH Q1H PRN


   PRN Reason: Pain (severe 7-10)


Sodium Chloride (Saline Flush)  10 ml FLUSH ASDIRECTED PRN


   PRN Reason: Keep Vein Open


Sodium Chloride (Saline Flush)  2.5 ml FLUSH ASDIRECTED PRN


   PRN Reason: Keep Vein Open


Sodium Chloride (Normal Saline)  10 ml IV ASDIRECTED PRN


   PRN Reason: IV Use


Sterile Water (Sterile Water For Irrigation)  1,000 ml IRR ASDIRECTED PRN


   PRN Reason: delivery


Terbutaline Sulfate (Brethine)  0.25 mg SUBCUT ASDIRECTED PRN


   PRN Reason: Tacysystole





Discontinued Medications





Fentanyl (Sublimaze) Confirm Administered Dose 100 mcg .ROUTE .STK-MED ONE


   Stop: 20 14:56


Lidocaine HCl (Xylocaine-Mpf 1%) Confirm Administered Dose 2 mls @ as directed 

.ROUTE .STK-MED ONE


   Stop: 20 14:17


Ropivacaine (Naropin 0.2%) Confirm Administered Dose 100 mls @ as directed 

.ROUTE .STK-MED ONE


   Stop: 20 14:56


Ropivacaine (Naropin 0.2%) Confirm Administered Dose 20 ml .ROUTE .STK-MED ONE


   Stop: 20 14:56











- Exam


General: Alert, Oriented, Cooperative, No Acute Distress


HEENT: Pupils Equal, Mucous Membr. Moist/Pink


Neck: Supple


Lungs: Clear to Auscultation, Normal Respiratory Effort


Cardiovascular: Regular Rate, Regular Rhythm


GI/Abdominal Exam: Normal Bowel Sounds, Soft, Non-Tender, No Organomegaly, No 

Distention, Other (Uterus firm @U.  Rectum intact, multiple bryant-anal condyloma 

noted.)


 (Female) Exam: Normal External Exam, Vaginal Bleeding (Scant rubra lochia)


Back Exam: Normal Inspection, Full Range of Motion


Extremities: Normal Inspection, Normal Range of Motion, Non-Tender, No Pedal 

Edema, Normal Capillary Refill


Skin: Warm, Dry, Intact


Wound/Incisions: Healing Well


Neurological: No New Focal Deficit


Psy/Mental Status: Alert, Normal Affect, Normal Mood





- Problem List & Annotations


(1)  (normal spontaneous vaginal delivery)


SNOMED Code(s): 82518462, 740376701


   Code(s): O80 - ENCOUNTER FOR FULL-TERM UNCOMPLICATED DELIVERY   Status: 

Acute   Priority: High   Current Visit: Yes   





(2)  delivery


SNOMED Code(s): 805873733, 910003073


   Code(s): O60.10X0 -  LABOR W  DELIVERY, UNSP TRIMESTER, UNSP   

Status: Acute   Priority: High   Current Visit: Yes   





- Problem List Review


Problem List Initiated/Reviewed/Updated: Yes





- My Orders


Last 24 Hours: 


My Active Orders





20 13:06


Patient Status [ADT] Routine 


Fetal Non Stress Test [RC] PER UNIT ROUTINE 


Up ad Christel [RC] ASDIRECTED 


Vaginal Exam [RC] Click to Edit 


Vital Signs [RC] PER UNIT ROUTINE 


Resuscitation Status Routine 





20 13:39


Patient Status [ADT] Routine 


Fetal Heart Tones [RC] CONTINUOUS 


Fetal Non Stress Test [RC] PER UNIT ROUTINE 


May Shower [RC] ASDIRECTED 


Notify Provider [RC] PRN 


Vaginal Exam [RC] PRN 


Vital Signs [RC] PER UNIT ROUTINE 


Butorphanol [Stadol]   1 mg IVPUSH Q1H PRN 


Carboprost Tromethamine [Hemabate DS]   250 mcg IM ASDIRECTED PRN 


Lidocaine 1% [Xylocaine 1%]   50 ml INJECT ONETIME PRN 


Methylergonovine [Methergine]   0.2 mg IM ASDIRECTED PRN 


Nalbuphine [Nubain]   10 mg IVPUSH Q1H PRN 


Sodium Chloride 0.9% [Normal Saline]   10 ml IV ASDIRECTED PRN 


Sodium Chloride 0.9% [Saline Flush]   10 ml FLUSH ASDIRECTED PRN 


Sodium Chloride 0.9% [Saline Flush]   2.5 ml FLUSH ASDIRECTED PRN 


Tranexamic Acid [Cyklokapron] 1,000 mg   Sodium Chloride 0.9% [Normal Saline] 

100 ml IV ONETIME 


Water For Irrigation,Sterile [Sterile Water for Irrigation]   1,000 ml IRR 

ASDIRECTED PRN 


miSOPROStoL [Cytotec]   200 mcg PO ONETIME PRN 


Fetal Scalp Electrode [WOMSER] Per Unit Routine 


Peripheral IV Insertion Adult [OM.PC] Routine 





20 13:45


Lactated Ringers [Ringers, Lactated] 1,000 ml IV ASDIRECTED 


Oxytocin/0.9 % Sodium Chloride [Oxytocin 30 Unit/500 ML-NS] 30 unit in 500 ml 

IV TITRATE 





20 14:15


RPR (SYPHILIS SERO) W/ RFLX [REF] Routine 














- Plan


Plan:: 


Continue with normal postpartum POC S/P uncomplicated .  See new orders.

## 2020-05-05 VITALS — HEART RATE: 74 BPM | DIASTOLIC BLOOD PRESSURE: 78 MMHG | SYSTOLIC BLOOD PRESSURE: 110 MMHG

## 2020-05-05 NOTE — [id number]
Post Anesthesia Note





- EVALUATION WITHIN 48HRS OF ANESTHETIC


Vital Signs in Normal Range: Yes


Patient Participated in Evaluation: Yes


Respiratory Function Stable: Yes


Airway Patent: Yes


Cardiovascular Function Stable: Yes


Hydration Status Stable: Yes


Pain Control Satisfactory: Yes


Nausea and Vomiting Control Satisfactory: Yes


Mental Status Recovered: Yes


Vital Signs: 


 Last Vital Signs











Temp  36.3 C   05/05/20 04:35


 


Pulse  73   05/05/20 04:35


 


Resp  16   05/05/20 04:35


 


BP  103/63   05/05/20 04:35


 


Pulse Ox  95   05/05/20 04:35














- COMMENTS/OBSERVATIONS


Free Text/Narrative:: 





Did well.  No problems post.

## 2020-05-05 NOTE — PCM.DCSUM1
**Discharge Summary





- Hospital Course


Diagnosis: Stroke: No





- Discharge Data


Discharge Date: 05/05/20


Discharge Disposition: Home, Self-Care 01


Condition: Good





- Referral to Home Health


Primary Care Physician: 


Farshad Fowler MD








- Patient Instructions


Diet: Usual Diet as Tolerated


Activity: As Tolerated





- Discharge Plan


Home Medications: 


 Home Meds





NIFEdipine [Nifedical XL] 30 mg PO DAILY #30 tab.er 04/08/20 [Rx]


Omeprazole 20 mg PO DAILY 04/08/20 [History]


Prenat 115/Iron Fum/Folic/Dss [Prenatal 19 Tablet] 1 tab PO DAILY 04/08/20 [

History]


Pyridoxine HCl (Vitamin B6) [B-6] 1 tab PO DAILY 04/08/20 [History]


Calcium Carbonate [Calcium] 1 tab PO DAILY 05/04/20 [History]


Folic Acid 1 tab PO DAILY 05/04/20 [History]








Referrals: 


[facility] [Outside]


Mary Lundberg CNM [Mid-Wife] - 06/22/20 1:00 pm





- Discharge Summary/Plan Comment


DC Time >30 min.: Yes





- General Info


Date of Service: 05/05/20


Functional Status: Reports: Pain Controlled





- Review of Systems


General: Reports: No Symptoms


HEENT: Reports: No Symptoms


Pulmonary: Reports: No Symptoms


Cardiovascular: Reports: No Symptoms


Gastrointestinal: Reports: No Symptoms


Genitourinary: Reports: No Symptoms


Musculoskeletal: Reports: No Symptoms


Skin: Reports: No Symptoms


Neurological: Reports: No Symptoms


Psychiatric: Reports: No Symptoms





- Patient Data


Vitals - Most Recent: 


 Last Vital Signs











Temp  36.4 C   05/05/20 19:20


 


Pulse  74   05/05/20 19:20


 


Resp  16   05/05/20 19:20


 


BP  110/78   05/05/20 19:20


 


Pulse Ox  99   05/05/20 19:20











Weight - Most Recent: 95.254 kg


Med Orders - Current: 


 Current Medications





Acetaminophen (Tylenol Extra Strength)  500 mg PO Q4H PRN


   PRN Reason: Pain


   Last Admin: 05/05/20 13:31 Dose:  500 mg


Acetaminophen (Tylenol Extra Strength)  1,000 mg PO Q4H PRN


   PRN Reason: Pain


   Last Admin: 05/05/20 19:15 Dose:  1,000 mg


Al Hydroxide/Mg Hydroxide (Mag-Al Plus)  30 ml PO Q8H PRN


   PRN Reason: Heartburn


Benzocaine/Menthol (Dermoplast Pain Relief 20%-0.5% Spray)  78 gm TOP 

ASDIRECTED PRN


   PRN Reason: Perineal Comfort Measure


   Last Admin: 05/04/20 23:31 Dose:  1 applic


Bisacodyl (Dulcolax)  10 mg RECTAL ONETIME PRN


   PRN Reason: Constipation


Docusate Sodium (Colace)  100 mg PO BID PRN


   PRN Reason: Constipation


   Last Admin: 05/04/20 23:35 Dose:  100 mg


Emollient Ointment (Lansinoh Hpa)  0 gm TOP ASDIRECTED PRN


   PRN Reason: Sore Nipples


   Last Admin: 05/04/20 23:31 Dose:  1 applic


Famotidine (Pepcid)  20 mg PO BID PRN


   PRN Reason: Heartburn


Ibuprofen (Motrin)  400 mg PO Q4H PRN


   PRN Reason: Pain


   Last Admin: 05/05/20 16:24 Dose:  400 mg


Ibuprofen (Motrin)  800 mg PO Q6H PRN


   PRN Reason: Pain


   Last Admin: 05/04/20 23:33 Dose:  800 mg


Oxycodone HCl (Oxycodone)  5 mg PO Q2H PRN


   PRN Reason: Pain


Sodium Chloride (Saline Flush)  10 ml FLUSH ASDIRECTED PRN


   PRN Reason: Keep Vein Open


Sodium Chloride (Saline Flush)  2.5 ml FLUSH ASDIRECTED PRN


   PRN Reason: Keep Vein Open


Witch Hazel (Tucks)  1 pad TOP ASDIRECTED PRN


   PRN Reason: comfort care


   Last Admin: 05/04/20 23:32 Dose:  1 applicful





Discontinued Medications





Butorphanol Tartrate (Stadol)  1 mg IVPUSH Q1H PRN


   PRN Reason: Pain


Carboprost Tromethamine (Hemabate Ds)  250 mcg IM ASDIRECTED PRN


   PRN Reason: Post Partum Hemorrhage


Fentanyl (Sublimaze) Confirm Administered Dose 100 mcg .ROUTE .STK-MED ONE


   Stop: 05/04/20 14:56


Tranexamic Acid 1,000 mg/ (Sodium Chloride)  110 mls @ 660 mls/hr IV ONETIME PRN


   PRN Reason: Bleeding


Lactated Ringer's (Ringers, Lactated)  1,000 mls @ 150 mls/hr IV ASDIRECTED MIRELLA


   Last Infusion: 05/04/20 16:10 Dose:  150 mls/hr


Oxytocin/Sodium Chloride (Oxytocin 30 Unit/500 Ml-Ns)  30 unit in 500 mls @ 500 

mls/hr IV TITRATE MIRELLA


Lidocaine HCl (Xylocaine-Mpf 1%) Confirm Administered Dose 2 mls @ as directed 

.ROUTE .[facility] ONE


   Stop: 05/04/20 14:17


Ropivacaine (Naropin 0.2%) Confirm Administered Dose 100 mls @ as directed 

.ROUTE .[facility] ONE


   Stop: 05/04/20 14:56


Oxytocin/Sodium Chloride (Oxytocin 30 Unit/500 Ml-Ns)  30 unit in 500 mls @ 2 

mls/hr IV TITRATE MIRELLA; Protocol


   Last Admin: 05/04/20 19:44 Dose:  2 munits/min, 2 mls/hr


Oxytocin/Sodium Chloride (Oxytocin 30 Unit/500 Ml-Ns) Confirm Administered Dose 

30 unit in 500 mls @ as directed .ROUTE .[facility] ONE


   Stop: 05/04/20 22:00


Lidocaine HCl (Xylocaine 1%)  50 ml INJECT ONETIME PRN


   PRN Reason: Laceration repair


Methylergonovine Maleate (Methergine)  0.2 mg IM ASDIRECTED PRN


   PRN Reason: Post Partum Hemorrhage


Misoprostol (Cytotec)  200 mcg PO ONETIME PRN


   PRN Reason: Post Partum Hemorrhage


Nalbuphine HCl (Nubain)  10 mg IVPUSH Q1H PRN


   PRN Reason: Pain (severe 7-10)


Ropivacaine (Naropin 0.2%) Confirm Administered Dose 20 ml .ROUTE .STK-MED ONE


   Stop: 05/04/20 14:56


Sodium Chloride (Saline Flush)  10 ml FLUSH ASDIRECTED PRN


   PRN Reason: Keep Vein Open


Sodium Chloride (Saline Flush)  2.5 ml FLUSH ASDIRECTED PRN


   PRN Reason: Keep Vein Open


Sodium Chloride (Normal Saline)  10 ml IV ASDIRECTED PRN


   PRN Reason: IV Use


Sterile Water (Sterile Water For Irrigation)  1,000 ml IRR ASDIRECTED PRN


   PRN Reason: delivery


Terbutaline Sulfate (Brethine)  0.25 mg SUBCUT ASDIRECTED PRN


   PRN Reason: Tacysystole











- Exam


General: Reports: Alert, Oriented


HEENT: Reports: Pupils Equal, Pupils Reactive, EOMI, Mucous Membr. Moist/Pink


Neck: Reports: Supple


Lungs: Reports: Clear to Auscultation, Normal Respiratory Effort


Cardiovascular: Reports: Regular Rate, Regular Rhythm


GI/Abdominal Exam: Normal Bowel Sounds, Soft, Non-Tender, No Organomegaly, No 

Distention, No Abnormal Bruit, No Mass, Pelvis Stable


 (Female) Exam: Normal External Exam, Normal Speculum Exam, Normal Bimanual 

Exam


Rectal (Female) Exam: Normal Exam, Normal Rectal Tone


Back Exam: Reports: Normal Inspection, Full Range of Motion


Extremities: Normal Inspection, Normal Range of Motion, Non-Tender, No Pedal 

Edema, Normal Capillary Refill


Skin: Reports: Warm, Dry, Intact


Wound/Incisions: Reports: Healing Well


Neurological: Reports: No New Focal Deficit


Psy/Mental Status: Reports: Alert, Normal Affect, Normal Mood

## 2020-05-05 NOTE — PCM.PNPP
- General Info


Date of Service: 20


Admission Dx/Problem (Free Text): 


 Patient Status Order with Admit Dx/Problem





20 13:06


Patient Status [ADT] Routine 





20 13:39


Patient Status [ADT] Routine 








 Admission Diagnosis/Problem











Admission Diagnosis/Problem    Planned pregnancy














20 14:07


Shira is a 34 yo  PPD1 S/P uncomplicated  to [facility] at 36.6 weeks 

gestation.  O pos, RI, GBS neg.  Pertient history includes: Obesity (BMI 32.9),

  Hx of PTL this pregnancy, would to right breast/chest wall drained 1 week ago

, healing well.  


Functional Status: Reports: Pain Controlled





- Review of Systems


General: Reports: No Symptoms


HEENT: Reports: No Symptoms


Pulmonary: Reports: No Symptoms


Cardiovascular: Reports: No Symptoms


Gastrointestinal: Reports: No Symptoms


Genitourinary: Reports: No Symptoms, Other (Moderate rubra lochia with few, 

small clots)


Musculoskeletal: Reports: No Symptoms


Skin: Reports: No Symptoms


Neurological: Reports: No Symptoms


Psychiatric: Reports: No Symptoms





- General Info


Date of Service: 20





- Patient Data


Vital Signs - Most Recent: 


 Last Vital Signs











Temp  97.4 F   20 04:35


 


Pulse  73   20 04:35


 


Resp  16   20 04:35


 


BP  103/63   20 04:35


 


Pulse Ox  95   20 04:35











Weight - Most Recent: 210 lb


I&O - Last 24 Hours: 


 Intake & Output











 20





 14:59 22:59 06:59


 


Intake Total   


 


Balance   











Lab Results - Last 24 Hours: 


 Laboratory Results - last 24 hr











  20 Range/Units





  14:15 14:15 


 


WBC  11.56 H   (4.0-11.0)  K/uL


 


RBC  4.52   (4.30-5.90)  M/uL


 


Hgb  12.9   (12.0-16.0)  g/dL


 


Hct  39.4   (36.0-46.0)  %


 


MCV  87.2   (80.0-98.0)  fL


 


MCH  28.5   (27.0-32.0)  pg


 


MCHC  32.7   (31.0-37.0)  g/dL


 


RDW Std Deviation  43.2   (28.0-62.0)  fl


 


RDW Coeff of Thompson  14   (11.0-15.0)  %


 


Plt Count  164   (150-400)  K/uL


 


MPV  12.80 H   (7.40-12.00)  fL


 


Nucleated RBC %  0.0   /100WBC


 


Nucleated RBCs #  0   K/uL


 


Blood Type   O POSITIVE  


 


Antibody Screen   NEGATIVE  











Med Orders - Current: 


 Current Medications





Acetaminophen (Tylenol Extra Strength)  500 mg PO Q4H PRN


   PRN Reason: Pain


Acetaminophen (Tylenol Extra Strength)  1,000 mg PO Q4H PRN


   PRN Reason: Pain


   Last Admin: 20 04:45 Dose:  1,000 mg


Al Hydroxide/Mg Hydroxide (Mag-Al Plus)  30 ml PO Q8H PRN


   PRN Reason: Heartburn


Benzocaine/Menthol (Dermoplast Pain Relief 20%-0.5% Spray)  78 gm TOP 

ASDIRECTED PRN


   PRN Reason: Perineal Comfort Measure


   Last Admin: 20 23:31 Dose:  1 applic


Bisacodyl (Dulcolax)  10 mg RECTAL ONETIME PRN


   PRN Reason: Constipation


Docusate Sodium (Colace)  100 mg PO BID PRN


   PRN Reason: Constipation


   Last Admin: 20 23:35 Dose:  100 mg


Emollient Ointment (Lansinoh Hpa)  0 gm TOP ASDIRECTED PRN


   PRN Reason: Sore Nipples


   Last Admin: 20 23:31 Dose:  1 applic


Famotidine (Pepcid)  20 mg PO BID PRN


   PRN Reason: Heartburn


Ibuprofen (Motrin)  400 mg PO Q4H PRN


   PRN Reason: Pain


Ibuprofen (Motrin)  800 mg PO Q6H PRN


   PRN Reason: Pain


   Last Admin: 20 23:33 Dose:  800 mg


Oxycodone HCl (Oxycodone)  5 mg PO Q2H PRN


   PRN Reason: Pain


Sodium Chloride (Saline Flush)  10 ml FLUSH ASDIRECTED PRN


   PRN Reason: Keep Vein Open


Sodium Chloride (Saline Flush)  2.5 ml FLUSH ASDIRECTED PRN


   PRN Reason: Keep Vein Open


Witch Hazel (Tucks)  1 pad TOP ASDIRECTED PRN


   PRN Reason: comfort care


   Last Admin: 20 23:32 Dose:  1 applicful





Discontinued Medications





Butorphanol Tartrate (Stadol)  1 mg IVPUSH Q1H PRN


   PRN Reason: Pain


Carboprost Tromethamine (Hemabate Ds)  250 mcg IM ASDIRECTED PRN


   PRN Reason: Post Partum Hemorrhage


Fentanyl (Sublimaze) Confirm Administered Dose 100 mcg .ROUTE .[facility]-MED ONE


   Stop: 20 14:56


Tranexamic Acid 1,000 mg/ (Sodium Chloride)  110 mls @ 660 mls/hr IV ONETIME PRN


   PRN Reason: Bleeding


Lactated Ringer's (Ringers, Lactated)  1,000 mls @ 150 mls/hr IV ASDIRECTED MIRELLA


   Last Infusion: 20 16:10 Dose:  150 mls/hr


Oxytocin/Sodium Chloride (Oxytocin 30 Unit/500 Ml-Ns)  30 unit in 500 mls @ 500 

mls/hr IV TITRATE MIRELLA


Lidocaine HCl (Xylocaine-Mpf 1%) Confirm Administered Dose 2 mls @ as directed 

.ROUTE .[employer]-MED ONE


   Stop: 20 14:17


Ropivacaine (Naropin 0.2%) Confirm Administered Dose 100 mls @ as directed 

.ROUTE .[facility]-MED ONE


   Stop: 20 14:56


Oxytocin/Sodium Chloride (Oxytocin 30 Unit/500 Ml-Ns)  30 unit in 500 mls @ 2 

mls/hr IV TITRATE MIRELLA; Protocol


   Last Admin: 20 19:44 Dose:  2 munits/min, 2 mls/hr


Oxytocin/Sodium Chloride (Oxytocin 30 Unit/500 Ml-Ns) Confirm Administered Dose 

30 unit in 500 mls @ as directed .ROUTE .[facility]-MED ONE


   Stop: 20 22:00


Lidocaine HCl (Xylocaine 1%)  50 ml INJECT ONETIME PRN


   PRN Reason: Laceration repair


Methylergonovine Maleate (Methergine)  0.2 mg IM ASDIRECTED PRN


   PRN Reason: Post Partum Hemorrhage


Misoprostol (Cytotec)  200 mcg PO ONETIME PRN


   PRN Reason: Post Partum Hemorrhage


Nalbuphine HCl (Nubain)  10 mg IVPUSH Q1H PRN


   PRN Reason: Pain (severe 7-10)


Ropivacaine (Naropin 0.2%) Confirm Administered Dose 20 ml .ROUTE [facility]


   Stop: 20 14:56


Sodium Chloride (Saline Flush)  10 ml FLUSH ASDIRECTED PRN


   PRN Reason: Keep Vein Open


Sodium Chloride (Saline Flush)  2.5 ml FLUSH ASDIRECTED PRN


   PRN Reason: Keep Vein Open


Sodium Chloride (Normal Saline)  10 ml IV ASDIRECTED PRN


   PRN Reason: IV Use


Sterile Water (Sterile Water For Irrigation)  1,000 ml IRR ASDIRECTED PRN


   PRN Reason: delivery


Terbutaline Sulfate (Brethine)  0.25 mg SUBCUT ASDIRECTED PRN


   PRN Reason: Tacysystole











- Infant Interaction


Infant Disposition, Postpartum: [place] to Nursery


Infant Feeding: Attempted Breastfeeding; Nursed Fair/Poor, Bottle Fed Infant, 

Encouraged to Breastfeed


Support Person: 





- Postpartum Recovery Exam


Fundal Tone: Firm


Fundal Level: 1 Fingerbreadths Below Umbilicus


Fundal Placement: Midline


Lochia Amount: Moderate


Lochia Color: Rubra/Red


Perineum Description: Edematous


Episiotomy/Laceration: Approximated


Bladder Status: Voiding





- Exam


General: Alert, Oriented


HEENT: Pupils Equal, Mucous Membr. Moist/Pink


Neck: Supple


Lungs: Clear to Auscultation, Normal Respiratory Effort


Cardiovascular: Regular Rate, Regular Rhythm


GI/Abdominal Exam: Normal Bowel Sounds, Soft, Non-Tender, No Organomegaly, No 

Distention, Other


Extremities: Normal Inspection, Normal Range of Motion, Non-Tender, No Pedal 

Edema, Normal Capillary Refill


Skin: Warm, Dry, Intact


Wound/Incisions: Other (Approximated)


Neurological: Normal Speech, Normal Tone, Reflexes Equal Bilateral


Psy/Mental Status: Alert, Normal Affect, Normal Mood





- Problem List & Annotations


(1)  (normal spontaneous vaginal delivery)


SNOMED Code(s): 84600365, 957073488


   Code(s): O80 - ENCOUNTER FOR FULL-TERM UNCOMPLICATED DELIVERY   Status: 

Acute   Priority: High   Current Visit: Yes   





(2)  delivery


SNOMED Code(s): 364150981, 108647377


   Code(s): O60.10X0 -  LABOR W  DELIVERY, UNSP TRIMESTER, UNSP   

Status: Acute   Priority: High   Current Visit: Yes   





- Problem List Review


Problem List Initiated/Reviewed/Updated: Yes





- My Orders


Last 24 Hours: 


My Active Orders





20 13:06


Fetal Non Stress Test [RC] PER UNIT ROUTINE 


Up ad Christel [RC] ASDIRECTED 


Vaginal Exam [RC] Click to Edit 


Vital Signs [RC] PER UNIT ROUTINE 





20 13:39


Fetal Heart Tones [RC] CONTINUOUS 


Fetal Non Stress Test [RC] PER UNIT ROUTINE 


May Shower [RC] ASDIRECTED 


Notify Provider [RC] PRN 


Vaginal Exam [RC] PRN 


Vital Signs [RC] PER UNIT ROUTINE 





20 14:15


RPR (SYPHILIS SERO) W/ RFLX [REF] Routine 





20 21:52


Patient Status [ADT] Routine 


May Shower [RC] ASDIRECTED 


Up ad Christel [RC] ASDIRECTED 


Vital Signs [RC] PER UNIT ROUTINE 


Acetaminophen [Tylenol Extra Strength]   1,000 mg PO Q4H PRN 


Acetaminophen [Tylenol Extra Strength]   500 mg PO Q4H PRN 


Alum Hydrox/Mag Hydrox/Simeth [Mag-Al Plus]   30 ml PO Q8H PRN 


Benzocaine/Menthol [Dermoplast Pain Relief 20%-0.5% Spray]   78 gm TOP 

ASDIRECTED PRN 


Docusate Sodium [Colace]   100 mg PO BID PRN 


Famotidine [Pepcid]   20 mg PO BID PRN 


Ibuprofen [Motrin]   400 mg PO Q4H PRN 


Ibuprofen [Motrin]   800 mg PO Q6H PRN 


Lanolin [Lansinoh HPA]   See Dose Instructions  TOP ASDIRECTED PRN 


Sodium Chloride 0.9% [Saline Flush]   10 ml FLUSH ASDIRECTED PRN 


Sodium Chloride 0.9% [Saline Flush]   2.5 ml FLUSH ASDIRECTED PRN 


bisacodyL [Dulcolax]   10 mg RECTAL ONETIME PRN 


oxyCODONE   5 mg PO Q2H PRN 


witch hazeL [Tucks]   1 pad TOP ASDIRECTED PRN 


Assess Lochia [WOMSER] Per Unit Routine 


Assess Uterine Involution [WOMSER] Per Unit Routine 


Peripheral IV Discontinue [OM.PC] Routine 


Saline Lock Insert [OM.PC] Urgent 


Resuscitation Status Routine 





20 21:53


Ice Therapy [OM.PC] Per Unit Routine 


Perineal Care [OM.PC] Per Unit Routine 


Sitz Bath [OM.PC] Per Unit Routine 





20 21:54


Cooling Warming Measures [RC] ASDIRECTED 





20 Breakfast


Regular Diet [DIET] 














- Plan


Plan:: 


PPD1.  Continue with normal postpartum POC S/P uncomplicated .  Continue to 

encourage breastfeeding.  Plan to D/C home in am.
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
## 2020-12-30 NOTE — CR
HISTORY:



Pain. Injury to the left leg.



COMPARISON:



None.



FINDINGS:



Three views of the left tibia and fibula. No evidence for acute fracture or 

dislocation. Soft tissues are within normal.



Dictated by Geneva Scales MD @ Dec 30 2020  9:40PM



Signed by Dr. Geneva Scales @ Dec 30 2020  9:41PM

## 2020-12-30 NOTE — CR
HISTORY:



Ankle pain.



COMPARISON:



None.



FINDINGS:



Three views of the left ankle. The ankle mortise appears intact. No 

evidence for acute fracture or dislocation. Mild soft tissue swelling about 

the ankle.



Dictated by Geneva Scales MD @ Dec 30 2020  9:39PM



Signed by Dr. Geneva Sacles @ Dec 30 2020  9:40PM